# Patient Record
Sex: MALE | Race: WHITE | NOT HISPANIC OR LATINO | URBAN - METROPOLITAN AREA
[De-identification: names, ages, dates, MRNs, and addresses within clinical notes are randomized per-mention and may not be internally consistent; named-entity substitution may affect disease eponyms.]

---

## 2018-06-24 ENCOUNTER — HOSPITAL ENCOUNTER (EMERGENCY)
Facility: MEDICAL CENTER | Age: 53
End: 2018-06-24
Attending: EMERGENCY MEDICINE | Admitting: INTERNAL MEDICINE

## 2018-06-24 VITALS
DIASTOLIC BLOOD PRESSURE: 81 MMHG | HEART RATE: 57 BPM | HEIGHT: 70 IN | TEMPERATURE: 98.9 F | RESPIRATION RATE: 16 BRPM | BODY MASS INDEX: 25.98 KG/M2 | SYSTOLIC BLOOD PRESSURE: 136 MMHG | OXYGEN SATURATION: 100 % | WEIGHT: 181.44 LBS

## 2018-06-24 DIAGNOSIS — T18.108A FOREIGN BODY IN ESOPHAGUS, INITIAL ENCOUNTER: ICD-10-CM

## 2018-06-24 PROCEDURE — 160203 HCHG ENDO MINUTES - 1ST 30 MINS LEVEL 4: Performed by: INTERNAL MEDICINE

## 2018-06-24 PROCEDURE — 160208 HCHG ENDO MINUTES - EA ADDL 1 MIN LEVEL 4: Performed by: INTERNAL MEDICINE

## 2018-06-24 PROCEDURE — 96375 TX/PRO/DX INJ NEW DRUG ADDON: CPT

## 2018-06-24 PROCEDURE — 700111 HCHG RX REV CODE 636 W/ 250 OVERRIDE (IP): Performed by: EMERGENCY MEDICINE

## 2018-06-24 PROCEDURE — 500066 HCHG BITE BLOCK, ECT: Performed by: INTERNAL MEDICINE

## 2018-06-24 PROCEDURE — 99285 EMERGENCY DEPT VISIT HI MDM: CPT

## 2018-06-24 PROCEDURE — 96374 THER/PROPH/DIAG INJ IV PUSH: CPT

## 2018-06-24 PROCEDURE — 160048 HCHG OR STATISTICAL LEVEL 1-5: Performed by: INTERNAL MEDICINE

## 2018-06-24 PROCEDURE — 88305 TISSUE EXAM BY PATHOLOGIST: CPT

## 2018-06-24 RX ORDER — OMEPRAZOLE 40 MG/1
40 CAPSULE, DELAYED RELEASE ORAL DAILY
Qty: 30 CAP | Refills: 0 | Status: SHIPPED | OUTPATIENT
Start: 2018-06-24

## 2018-06-24 RX ADMIN — PROPOFOL 180 MG: 10 INJECTION, EMULSION INTRAVENOUS at 16:20

## 2018-06-24 RX ADMIN — GLUCAGON HYDROCHLORIDE 1 MG: 1 INJECTION, POWDER, FOR SOLUTION INTRAMUSCULAR; INTRAVENOUS; SUBCUTANEOUS at 15:36

## 2018-06-24 ASSESSMENT — PAIN SCALES - GENERAL
PAINLEVEL_OUTOF10: 2
PAINLEVEL_OUTOF10: 0

## 2018-06-24 NOTE — ED TRIAGE NOTES
52 y/o male ambulatory to triage with c/o piece of steak stuck in his throat. Pt states he ate steak last night and has had difficulty swallowing since. Pt states this has happened once in the past and medication (glucagon) helped the food pass. Pt unable to swallow fluids/food since the event.

## 2018-06-24 NOTE — ED PROVIDER NOTES
"ED Provider Note    Scribed for Raul Asher M.D. by Shannen Currie. 6/24/2018, 2:24 PM.    Primary care provider: None noted  Means of arrival: Walk-in  History obtained from: Patient  History limited by: None    CHIEF COMPLAINT  Chief Complaint   Patient presents with   • Swallowed Foreign Body     swallowed steak last night, has had difficulty swallowing since     [facility]  Deepak Mckee is a 53 y.o. male who presents to the Emergency Department with a sensation of foreign body in his esophagus since he ate steak yesterday at 4:30 PM. He is unable to pass food or water since this happened. He has had this happen previously, and he was given IV medications which caused the body to pass spontaneously. This was done in a facility in [place]. He has no underlying esophageal pathology that he is aware of but has never undergone GI evaluation. He has no other symptoms such as abdominal pain, nausea, vomiting or diarrhea.     REVIEW OF SYSTEMS  Pertinent positives include swallowed foreign body. Pertinent negatives include no abdominal pain, nausea, vomiting, diarrhea. As above, all other systems reviewed and are negative.   See [facility] for further details.     PAST MEDICAL HISTORY  Patient denies past medical history.     SURGICAL HISTORY  patient denies any surgical history    SOCIAL HISTORY  Social History   Substance Use Topics   • Smoking status: Former Smoker   • Smokeless tobacco: Former User   • Alcohol use Yes      History   Drug Use No     FAMILY HISTORY  History reviewed. No pertinent family history.    CURRENT MEDICATIONS  Patient takes no medications.     ALLERGIES  No Known Allergies    PHYSICAL EXAM  VITAL SIGNS: /81   Pulse 78   Temp 37.2 °C (98.9 °F)   Resp 16   Ht 1.778 m (5' 10\")   Wt 82.3 kg (181 lb 7 oz)   SpO2 97%   BMI 26.03 kg/m²   Vitals reviewed.  Constitutional: Alert in no apparent distress.  HENT: No signs of trauma, Bilateral external ears normal, Nose normal. "   Eyes: Pupils are equal and reactive, Conjunctiva normal, Non-icteric.   Neck: Normal range of motion, No tenderness, Supple, No stridor.   Lymphatic: No lymphadenopathy noted.   Cardiovascular: Regular rate and rhythm, no murmurs.   Thorax & Lungs: Normal breath sounds, No respiratory distress, No wheezing, No chest tenderness.   Abdomen: Bowel sounds normal, Soft, No tenderness, No peritoneal signs, No masses, No pulsatile masses.   Skin: Warm, Dry, No erythema, No rash.   Back: No bony tenderness, No CVA tenderness.   Extremities: Intact distal pulses, No edema, No tenderness, No cyanosis  Musculoskeletal: Good range of motion in all major joints. No tenderness to palpation or major deformities noted.   Neurologic: Alert , Normal motor function, Normal sensory function, No focal deficits noted.   Psychiatric: Affect normal, Judgment normal, Mood normal.     DIAGNOSTIC STUDIES / PROCEDURES    Conscious Sedation Procedure Note    Indication: endoscopy    Consent: I have discussed with the patient and/or the patient representative the indication, alternatives, and the possible risks and/or complications of the planned procedure and the anesthesia methods. The patient and/or patient representative appear to understand and agree to proceed.    Physician Involvement: The attending physician was present and supervising this procedure.    Pre-Sedation Documentation and Exam: I have personally completed a history, physical exam & review of systems for this patient (see notes).  Airway Assessment: normal    Prior History of Anesthesia Complications: none    ASA Classification: Class 1 - A normal healthy patient    Sedation/ Anesthesia Plan: intravenous sedation    Medications Used: propofol 180 mg intravenously    Monitoring and Safety: The patient was placed on a cardiac monitor and vital signs, pulse oximetry and level of consciousness were continuously evaluated throughout the procedure. The patient was closely  monitored until recovery from the medications was complete and the patient had returned to baseline status. Respiratory therapy was on standby at all times during the procedure.    (The following sections must be completed)  Post-Sedation Vital Signs: Vital signs were reviewed and were stable after the procedure (see flow sheet for vitals)            Post-Sedation Exam: Lungs: clear           Complications: none    COURSE & MEDICAL DECISION MAKING  Nursing notes, VS, PMSFHx reviewed in chart.    2:24 PM Patient seen and examined at bedside. Patient will be treated with Glucagon IV 1 mg for his symptoms. GI will be consulted.    3:40 PM Paged GI.     3:41 PM Phone consult with GI, Dr. Malave, who will come see the patient.     4:07 PM Patient was reevaluated at bedside, and GI is now present. He will be performing endoscopy with conscious sedation. The patient will be treated with propofol for conscious sedation. Risks, benefits and alternatives to conscious sedation using propofol were discussed with the patient. He understands and would like to continue with conscious sedation.     4:23 PM Patient was reevaluated at bedside for conscious sedation.     4:37 PM Endoscopy complete. Foreign body was removed and biopsy to rule out eosinophilic esophagitis was collected and sent to lab. See note above for conscious sedation note.     5:12 PM Patient was reevaluated at bedside and is now alert and responsive. He does not remember the endoscopy. He will be discharged home at this time. He is instructed to follow up with Dr. Malave. He is also instructed to start taking PPI, per recommendations of Dr. Malave. If he has any worsening symptoms he is instructed to return to [facility] ED. The patient has no primary care physician therefore he is instructed to follow up with his new insurance company to find a list of preferred providers. Patient is agreeable to discharge plan with no further plans.     The patient will return for  new or worsening symptoms and is stable at the time of discharge.    The patient is referred to a primary physician for blood pressure management, diabetic screening, and for all other preventative health concerns.    DISPOSITION:  Patient will be discharged home in stable condition.    FOLLOW UP:  [facility], Emergency Dept  [address]  [place] [address] [phone]    If symptoms worsen    Tommy Malave D.O.  [address] [place] [address] [phone]      call for follow up, GI specialist          check with your new insurance for a list of preferred poviders to establish a primary care doctor      OUTPATIENT MEDICATIONS:  New Prescriptions    OMEPRAZOLE (PRILOSEC) 40 MG DELAYED-RELEASE CAPSULE    Take 1 Cap by mouth every day.     FINAL IMPRESSION  1. Foreign body in esophagus, initial encounter    2.      Conscious sedation procedure by ERP 15 minutes face to face     Shannen HERRERA (Scribe), am scribing for, and in the presence of, Raul Asher M.D..    Electronically signed by: Shannen Currie (Scribe), 6/24/2018    Raul HERRERA M.D. personally performed the services described in this documentation, as scribed by Shannen Currie in my presence, and it is both accurate and complete.    The note accurately reflects work and decisions made by me.  Raul Asher  6/24/2018  5:24 PM

## 2018-06-24 NOTE — PROCEDURES
DATE OF SERVICE:  06/24/2018    PROCEDURE PERFORMED:  Esophagogastroduodenoscopy.    ANESTHESIA:  Per emergency room physician, propofol.    PREOPERATIVDIAGNOSIS:  Esophageal food impaction.    POSTOPERATIVE DIAGNOSES:  Esophageal food impaction, likely eosinophilic   esophagitis and esophageal stricture at the gastroesophageal junction.    DESCRIPTION OF PROCEDURE:  After informed consent and appropriate sedation,   the patient was placed in the left lateral position and the adult gastroscope   was advanced through the oropharynx into the esophagus where at approximately   40 cm very near the GE junction, a large food bolus was seen that was   consistent with the patient's intake of steak yesterday.  Two grasps with a   rat tooth forceps was successful in removing the entire food bolus.  At that   time, the gastroscope was advanced back into the oropharynx through to the   esophagus into the stomach through to the second portion of the duodenum.  At   that time, the mucosa was examined carefully.  The scope was withdrawn gently.    The duodenum was unremarkable.  The gastric antrum and body were   unremarkable.  On retroflexion, the gastric cardia and fundus were also   unremarkable.  The scope was withdrawn back into the esophagus.  There was a   moderately severe partially circumferential stricture near the GE junction as   well as trachealization of the distal esophagus.  Two cold forceps biopsies   were used to get tissue for diagnosis.  The scope was withdrawn and the bowel   was decompressed.  There were no immediate postop complications.    RECOMMENDATIONS:  1.  PPI orally b.i.d., high dose, for 8 weeks.  2.  Recommend repeat EGD in 8 weeks with possible dilation as an outpatient.  3.  Recommend chewing his food very well and ideally for the next 2-3 days,   sticking to a full liquid to soft diet with a mashed potato consistency.  4.  Based on biopsy results, we will decide whether the patient is a  candidate   for steroid treatment as he may have eosinophilic esophagitis.  We will   discuss this as an outpatient.  I recommend he follow up with myself at the   [facility] in 4-6 weeks.  5.  I anticipate the patient will be discharged directly from the ER.       ____________________________________     DO MIREYA Monaco    DD:  06/24/2018 16:41:22  DT:  06/24/2018 16:50:13    D#:  [id number]  Job#:  [id number]

## 2018-06-24 NOTE — CONSULTS
DATE OF SERVICE:  06/24/2018    REASON FOR CONSULTATION:  Esophageal food impaction.    HISTORY OF PRESENT ILLNESS:  Patient is a 53-year-old male who has a past   medical history of esophageal food impactions previously, although he has   never seen a gastroenterologist or underwent an esophagogastroduodenoscopy.    The patient reports that in the past, they gave him glucagon and the food   bolus always passed.  The patient reports that yesterday he was eating steak   and on his first bite, he feels that he did not bite enough and it lodged in   his esophagus.  Patient reports he has not ate any food since then.  Patient   has been unable to tolerate water since then.  Patient denies any current   nausea, vomiting, fever, chills, chest pain, shortness of breath, blood in the   stools, or abdominal pain.  Patient denies any previous GERD or dysphagia   other than when he does not take enough bites.    PAST MEDICAL HISTORY:  The patient denies.    PAST SURGICAL HISTORY:  The patient denies.    ALLERGIES:  No known drug allergies.    MEDICATIONS:  The patient denies any over-the-counter or prescription   medications including no NSAIDs.    FAMILY HISTORY:  Noncontributory.    SOCIAL HISTORY:  The patient admits to rare alcohol use, typically on the   weekends.  The patient denies any illicit drug use or tobacco use.    REVIEW OF SYSTEMS:  A 14-point review of systems was obtained and found to be   negative except those above in the HPI.    PHYSICAL EXAMINATION:  GENERAL:  No acute distress, alert, awake, and oriented x3.  VITAL SIGNS:  Stable.  HEENT:  PERRLA.  Extraocular movements are intact.  Sclerae are anicteric.  CARDIOVASCULAR:  Regular rate and rhythm.  S1, S2.  No murmurs auscultated.  LUNGS:  Clear to auscultation bilaterally.  ABDOMEN:  Bowel sounds present x4, soft, nontender.  No guarding or rebound.  MUSCULOSKELETAL:  No edema noted, bilateral lower extremities.  NEUROLOGIC:  Grossly  intact.  PSYCHIATRIC:  Affect is normal.  SKIN:  No jaundice or pallor.    RESULTS:  one at this time.    ASSESSMENT AND PLAN:  1.  Esophageal food impaction.  Plan for urgent EGD in the ER for further   evaluation.  Please see endoscopy note for any further details and any   recommendations such as PPI use or repeat EGDs in the future.  Of course, I   recommend the patient chew his food very well in the future to avoid problems   like this.  We will evaluate for any esophageal obstruction such as esophageal   ring, web, stricture, malignancy, or severe esophagitis.  2.  Dysphagia, see above.  Plan to do EGD to evaluate further.  The patient   does have a history of this, but this is likely related to some anatomical   defect whether a stricture or ring.  See endoscopy report.       ____________________________________     DO MIREYA Monaco / ANN    DD:  06/24/2018 16:03:56  DT:  06/24/2018 16:17:58    D#:  [id number]  Job#:  [id number]

## 2018-06-24 NOTE — ED NOTES
Pt reports eating a large piece of steak that became lodged in his esophagus yesterday at approx 1630. Pt continues to be able to speak and pass air in and out of his lungs. Pt unable to drink or eat, water is immediately vomited back up as well as saliva

## 2018-06-24 NOTE — ED NOTES
Answered questions, addressed needs, ensured call light within reach. Provided emotional support for pt. Pt request blanket, blanket provided

## 2018-06-25 NOTE — ED NOTES
Pt received discharge, follow up and return to ED instructions; pt verbalized understanding. IV was removed, site WNL, dressing applied. Pt denied any needs/pain. Pt ambulated with a steady gait. Pt received (1) paper rx at [facility].

## 2018-06-25 NOTE — DISCHARGE INSTRUCTIONS
Swallowed Foreign Body, Adult  Introduction  A swallowed foreign body means that you swallow something and it gets stuck. It might be food or something else. The object may get stuck in the tube that connects your throat to your stomach (esophagus), or it may get stuck in another part of your belly (digestive tract). It is very important to tell your doctor what you have swallowed.  Sometimes, the object will pass through your body on its own. Sometimes, the object will pass after you are given a medicine to relax your throat. The object may need to be taken out by a doctor if it is dangerous or if it will not pass through your body on its own. An object may need to be removed with surgery if:  · It gets stuck in your throat.  · You cannot swallow.  · You cannot breathe well.  · It is sharp.  · It is harmful or poisonous (toxic), like batteries or drugs.  Follow these instructions at home:  · Eat what you normally eat if your doctor says that you can.  · Keep checking your poop (stool) to see if the object has come out of your body.  · Call your doctor if the object has not come out of your body after 3 days.  If you had surgery (endoscopy) to remove the object:  · Care for yourself after surgery as told by your doctor.  Keep all follow-up visits as told by your doctor. This is important.  Contact a doctor if:  · You still have problems after you have been treated.  · The object has not come out of your body after 3 days.  Get help right away if:  · You have a fever.  · You have pain in your chest or your belly.  · You cough up blood.  · You have blood in your poop or your throw up (vomit).  This information is not intended to replace advice given to you by your health care provider. Make sure you discuss any questions you have with your health care provider.  Document Released: 04/03/2012 Document Revised: 05/25/2017 Document Reviewed: 03/16/2016  © 2017 [employer]

## 2024-06-24 ENCOUNTER — NON-PROVIDER VISIT (OUTPATIENT)
Dept: URGENT CARE | Facility: PHYSICIAN GROUP | Age: 59
End: 2024-06-24

## 2024-06-24 DIAGNOSIS — Z02.83 ENCOUNTER FOR DRUG SCREENING: ICD-10-CM

## 2024-06-24 PROCEDURE — 8907 PR URINE COLLECT ONLY: Performed by: REGISTERED NURSE
